# Patient Record
Sex: FEMALE | Race: WHITE | Employment: FULL TIME | URBAN - METROPOLITAN AREA
[De-identification: names, ages, dates, MRNs, and addresses within clinical notes are randomized per-mention and may not be internally consistent; named-entity substitution may affect disease eponyms.]

---

## 2023-02-06 NOTE — PROGRESS NOTES
ASSESSMENT/PLAN:  Below is the assessment and plan developed based on review of pertinent history, physical exam, labs, studies, and medications. 1. Left wrist pain  -     XR WRIST LT AP/LAT/OBL MIN 3V; Future  2. Sprain of metacarpophalangeal (MCP) joint of left thumb, initial encounter  -     REFERRAL TO DME  -     FL WHFO W/O JOINTS PRE OTS  -     methylPREDNISolone (MEDROL DOSEPACK) 4 mg tablet; Take 1 Tablet by mouth Specific Days and Specific Times. Used as directed, Normal, Disp-1 Dose Pack, R-0  -     meloxicam (MOBIC) 15 mg tablet; Take 1 Tablet by mouth daily. , Normal, Disp-30 Tablet, R-3    No follow-ups on file. In discussion with the patient, we considered the numerus possible diagnoses that could be contributing to their present symptoms. We also deliberated on the extensive management options that must be considered to treat their current condition. We reviewed their accessible prior medical records, diagnostic tests, and current health and employment information. We considered how these symptoms were affecting the patient´s activities of daily living as well as employment and fitness activities. The patient had various questions regarding the possible risks, benefits, complications, morbidity and mortality regarding their diagnosis and treatment options. The patients´ comorbidities were considered, and I advocated that they consider maximizing lifestyle modification through nutrition and exercise to aid in addressing their symptoms. Shared decision making yielded an understanding to move forward with conservation treatment preferences. The patient expressed understanding that if conservative management fails to alleviate the present symptoms they will return to office for re-evaluation and consideration of additional diagnostic tests and potential surgical options.      In the interim, we have recommended ice, elevation, and take prescription anti-inflammatory medications along with a physician directed home exercise program. We discussed the risks and common side effects of anti-inflammatory medications and instructed the patient to discontinue the medication and contact us if they experienced any side effects. The patient was encouraged to discuss the possible side effects with their family physician or pharmacist prior to initiating any new medications. We discussed the fact that many of the recommended treatment options presented are significantly limited by the patient´s social determinants of health. We also reviewed the circumstances surrounding the environment that they live and work which affect a wide range of health risk. We considered the limited access to appropriate educational resources regarding proper nutrition and exercise as well as the economic and social support necessary to maintain health and wellbeing. After a long discussion regarding treatment options, we have decided to prescribe an oral medication. We discussed the risks and common side effects of the medication and instructed the patient to discontinue the medication and contact us if they experienced any side effects. We also encouraged the patient to discuss the possible side effects with their family physician or pharmacist prior to initiating any new medications. Given that the patient's symptoms are increasing in frequency and duration, we are referring the patient to our durable medical goods department for consideration of treating their symptoms with a brace. As prescribed, the orthosis provides adequate stabilization and support and will assist the patient with pain relief and reduction of symptoms. The patient was advised in the wearing of the orthosis during activities of daily living and the application, removal, and care of the brace. All questions were answered, and the patient left today with a properly fitted brace.  The patient will contact our office with any questions or concerns regarding the use of the brace or current condition. About the fact that she had an inflamed MCP joint of her left thumb. We talked about immobilization as well as medications. We will see how she responds to the splint as well as meloxicam and a Medrol Dosepak. If she is not improved we will refer her to one of our hand specialist to consider a corticosteroid injection. SUBJECTIVE/OBJECTIVE:  Elisa Alex (: 1975) is a 50 y.o. female, patient,here for evaluation of the Wrist Pain (left) and Hand Pain (left)  . Patient seen today for the left thumb. She reports she had pain in the left thumb for approximately a month. She denies any trauma. She reports she has had some swelling. She is taken some Aleve. She is very active right-hand-dominant in retail. She denies any numbness or tingling. She denies any fever chills or night sweats. She has a history of rheumatoid arthritis. PHYSICAL EXAM:    Examination left thumb reveals she does have some soft tissue swelling but no warmth in the basal joint of the thumb. She has good range of motion. There is no crepitus. She has discomfort with grind testing. Her joint stable. She has good strength. She is neurovascular intact distally. IMAGING:    I have independently reviewed and interpreted the following images:     3 views of the left wrist show no evidence of fracture or dislocation. No evidence of severe arthritis in the basal joint. No Known Allergies    Current Outpatient Medications   Medication Sig    escitalopram oxalate (Lexapro) 10 mg tablet Take 10 mg by mouth daily. methylPREDNISolone (MEDROL DOSEPACK) 4 mg tablet Take 1 Tablet by mouth Specific Days and Specific Times. Used as directed    meloxicam (MOBIC) 15 mg tablet Take 1 Tablet by mouth daily. No current facility-administered medications for this visit.        Past Medical History:   Diagnosis Date    Cancer ([facility] [address] 2012    [place] [address] disorder     STRESS MANAGEMENT WITH [employer]       Past Surgical History:   Procedure Laterality Date    HX  SECTION      X2    HX DILATION AND CURETTAGE      HX HEENT      WISDOM TEETH       Family History   Problem Relation Age of Onset    Asthma Mother     Heart Disease Father         CABG    Cancer Maternal Grandmother         COLON [place]       Social History     Socioeconomic History    Marital status:      Spouse name: Not on file    Number of children: Not on file    Years of education: Not on file    Highest education level: Not on file   Occupational History    Not on file   Tobacco Use    Smoking status: Former     Packs/day: 0.25     Years: 10.00     Pack years: 2.50     Types: Cigarettes     Quit date: 2000     Years since quittin.5    Smokeless tobacco: Not on file   Vaping Use    Vaping Use: Never used   Substance and Sexual Activity    Alcohol use: Yes     Alcohol/week: 5.0 standard drinks     Types: 6 Glasses of wine per week    Drug use: Never    Sexual activity: Not on file   Other Topics Concern    Not on file   Social History Narrative    Not on file     Social Determinants of Health     Financial Resource Strain: Not on file   Food Insecurity: Not on file   Transportation Needs: Not on file   Physical Activity: Not on file   Stress: Not on file   Social Connections: Not on file   Intimate Partner Violence: Not on file   Housing Stability: Not on file       Review of Systems    No flowsheet data found. Vitals:  Ht 5' 7\" (1.702 m)   Wt 138 lb (62.6 kg)   BMI 21.61 kg/m²    Body mass index is 21.61 kg/m². An electronic signature was used to authenticate this note.   -- Henry Baez MD

## 2023-02-07 ENCOUNTER — OFFICE VISIT (OUTPATIENT)
Dept: ORTHOPEDIC SURGERY | Age: 48
End: 2023-02-07

## 2023-02-07 VITALS — HEIGHT: 67 IN | WEIGHT: 138 LBS | BODY MASS INDEX: 21.66 KG/M2

## 2023-02-07 DIAGNOSIS — M25.532 LEFT WRIST PAIN: Primary | ICD-10-CM

## 2023-02-07 DIAGNOSIS — S63.642A SPRAIN OF METACARPOPHALANGEAL (MCP) JOINT OF LEFT THUMB, INITIAL ENCOUNTER: ICD-10-CM

## 2023-02-07 RX ORDER — ESCITALOPRAM OXALATE 10 MG/1
10 TABLET ORAL DAILY
COMMUNITY

## 2023-02-07 RX ORDER — METHYLPREDNISOLONE 4 MG/1
4 TABLET ORAL
Qty: 1 DOSE PACK | Refills: 0 | Status: SHIPPED | OUTPATIENT
Start: 2023-02-07

## 2023-02-07 RX ORDER — MELOXICAM 15 MG/1
15 TABLET ORAL DAILY
Qty: 30 TABLET | Refills: 3 | Status: SHIPPED | OUTPATIENT
Start: 2023-02-07